# Patient Record
Sex: MALE | Race: WHITE
[De-identification: names, ages, dates, MRNs, and addresses within clinical notes are randomized per-mention and may not be internally consistent; named-entity substitution may affect disease eponyms.]

---

## 2020-05-01 ENCOUNTER — HOSPITAL ENCOUNTER (EMERGENCY)
Dept: HOSPITAL 60 - LB.ED | Age: 65
Discharge: HOME | End: 2020-05-01
Payer: COMMERCIAL

## 2020-05-01 DIAGNOSIS — Z79.01: ICD-10-CM

## 2020-05-01 DIAGNOSIS — Z79.899: ICD-10-CM

## 2020-05-01 DIAGNOSIS — Z87.891: ICD-10-CM

## 2020-05-01 DIAGNOSIS — I10: ICD-10-CM

## 2020-05-01 DIAGNOSIS — I48.91: Primary | ICD-10-CM

## 2020-05-01 PROCEDURE — 99283 EMERGENCY DEPT VISIT LOW MDM: CPT

## 2020-05-01 PROCEDURE — 93005 ELECTROCARDIOGRAM TRACING: CPT

## 2020-05-01 NOTE — EDM.PDOC
ED HPI GENERAL MEDICAL PROBLEM





- General


Chief Complaint: General


Stated Complaint: PALPITATIONS


Time Seen by Provider: 05/01/20 02:00





- History of Present Illness


INITIAL COMMENTS - FREE TEXT/NARRATIVE: 











Gallo presented with concern of anxiety.  He states that he has been treated 

recently for anxiety related to his new diagnosis of atrial fibrillation.  He 

is being anticoagulated on Xarelto, and awaits elective cardioversion with his 

cardiologist in the [facility].  He does have some lorazepam but did not take this 

up to their cabin.  He was having some insomnia tonight.  He describes a 

certain sense that things are not right with his body, but no actual 

palpitations or chest discomfort.  He has a difficult time describing his 

relative dyspnea, and notes that this is ever so slight.  He believes that his 

air hunger and overall sensation of worrying about his body seems to correlate 

with the atrial fibrillation.  He uses his lorazepam sparingly.  He did not 

have any other issues tonight, but was wondering if anxiety was a trigger.  He 

did take 2 Tylenol PM which was ineffective.  He came in mainly for 

reassurance.  He really is not interested in duplicating his extensive lab work-

up that he recently had.  At this point, he felt better coming in, and even 

being in the presence of my nurse.














- Related Data


 Allergies











Allergy/AdvReac Type Severity Reaction Status Date / Time


 


No Known Allergies Allergy   Verified 05/01/20 01:21











Home Meds: 


 Home Meds





Metoprolol Succinate 100 mg PO DAILY 05/01/20 [History]


Rivaroxaban [Xarelto] 20 mg PO DAILY 05/01/20 [History]


Tamsulosin HCl [Flomax] 0.4 mg PO DAILY 05/01/20 [History]


lisinopriL [Lisinopril] 2.5 mg PO DAILY 05/01/20 [History]











Past Medical History


HEENT History: Reports: Impaired Vision


Cardiovascular History: Reports: Afib, Hypertension


Gastrointestinal History: Reports: GERD


Genitourinary History: Reports: Prostate Disorder


Psychiatric History: Reports: Anxiety


Hematologic History: Reports: Anticoagulation Therapy


Oncologic (Cancer) History: Reports: Bladder





Social & Family History





- Family History


Family Medical History: Noncontributory





- Tobacco Use


Smoking Status *Q: Former Smoker


Years of Tobacco use: 20


Packs/Tins Daily: 0.5


Used Tobacco, but Quit: Yes


Month/Year Tobacco Last Used: 2017


Second Hand Smoke Exposure: No





- Caffeine Use


Caffeine Use: Reports: Coffee





- Recreational Drug Use


Recreational Drug Use: No





ED ROS GENERAL





- Review of Systems


Review Of Systems: See Below


Respiratory: Reports: Shortness of Breath.  Denies: Wheezing


Cardiovascular: Reports: Palpitations





ED EXAM, GENERAL





- Physical Exam


Exam: See Below


Exam Limited By: No Limitations


General Appearance: Alert, WD/WN, No Apparent Distress, Anxious


Throat/Mouth: Normal Inspection


Head: Atraumatic, Normocephalic


Neck: Full Range of Motion, Other (no jvd)


Respiratory/Chest: No Respiratory Distress, Lungs Clear, Normal Breath Sounds


Cardiovascular: Tachycardia.  No: Systolic Murmur


Extremities: Normal Inspection, Normal Range of Motion, Non-Tender, No Pedal 

Edema, Normal Capillary Refill


Neurological: Alert, Oriented, Normal Cognition


Psychiatric: Normal Affect, Anxious


Skin Exam: Warm, Dry





EKG INTERPRETATION


EKG Interpretation Comments: 





a flutter with some non-specific ST changes in mid-anterior lead





Course





- Vital Signs


Text/Narrative:: 





She described his symptoms as best as they could.  Honestly, I tried to explain 

how I think this could be related to the loss of his atrial kick and some 

temporary relative pulmonary venous congestion.  I tried to normalize his 

appropriate anxiety response, and he seems to understand that excessive 

worrying can further complicate this.  Did feel better after taking the 

lorazepam, and we provided him with a handful of tablets to get through until 

Sunday.  He seems reliable to follow-up with any persistent, worsening, or 

different concerns should they arise while here.


Last Recorded V/S: 





 Last Vital Signs











Temp  96.4 F L  05/01/20 01:25


 


Pulse  89   05/01/20 01:25


 


Resp  16   05/01/20 01:25


 


BP  119/90   05/01/20 01:25


 


Pulse Ox  99   05/01/20 01:25














- Orders/Labs/Meds


Orders: 





 Active Orders 24 hr











 Category Date Time Status


 


 EKG Documentation Completion [RC] ASDIRECTED Care  05/01/20 01:51 Active


 


 EKG 12 Lead [EK] Routine Ther  05/01/20 01:30 Ordered











Meds: 





Medications














Discontinued Medications














Generic Name Dose Route Start Last Admin





  Trade Name Freq  PRN Reason Stop Dose Admin


 


Lorazepam  1 mg  05/01/20 01:42  05/01/20 01:43





  Ativan  PO  05/01/20 01:43  1 mg





  ONETIME ONE   Administration





     





     





     





     














Departure





- Departure


Time of Disposition: 02:15


Disposition: DC/[facility] to [facility]


Clinical Impression: 


Atrial fibrillation


Qualifiers:


 Atrial fibrillation type: unspecified Qualified Code(s): I48.91 - Unspecified 

atrial fibrillation








- Discharge Information


Instructions:  Atrial Flutter


Referrals: 


PCP,None [Primary Care Provider] - 


Forms:  ED Department Discharge


Additional Instructions: 


Continue with all regularly prescribed medications as directed.  Should 

symptoms worsen or persist, return for further evaluation.  Call with any 

questions.





Sepsis Event Note





- Evaluation


Sepsis Screening Result: No Definite Risk





- Focused Exam


Vital Signs: 





 Vital Signs











  Temp Pulse Resp BP Pulse Ox


 


 05/01/20 01:25  96.4 F L  89  16  119/90  99











Date Exam was Performed: 05/01/20


Time Exam was Performed: 02:24





- My Orders


Last 24 Hours: 





My Active Orders





05/01/20 01:30


EKG 12 Lead [EK] Routine 





05/01/20 01:51


EKG Documentation Completion [RC] ASDIRECTED 














- Assessment/Plan


Last 24 Hours: 





My Active Orders





05/01/20 01:30


EKG 12 Lead [EK] Routine 





05/01/20 01:51


EKG Documentation Completion [RC] ASDIRECTED